# Patient Record
(demographics unavailable — no encounter records)

---

## 2025-02-27 NOTE — HISTORY OF PRESENT ILLNESS
[FreeTextEntry1] : Ajit is a 9yo boy with no significant past history presenting for hospital follow up of recently diagnosed functional neurologic disorder.   Interval Hx: Since discharge, [facility] has noticed that Reji will be unable to walk when he becomes frustrated at home. When he goes to the park or other activity, his symptoms resolve and he is able to play soccer. Videos reviewed of him army crawling at home and playing soccer just an hour later. He denies any symptoms of numbness or backpain, no headaches. Symptoms seem to come and go quickly. MOC denies any known history of trauma or stressors.    Hospital Course: Discharge Date 16-Feb-2025 Admission Date 14-Feb-2025 22:30  Hospital Course  8y10m M w/ NSPMHx presenting with 5d of inability to walk. For past 2-3 weeks, he had cough without fever. He was treated with amoxicillin and Motrin. On Monday 2/10, PMD prescribed prednisone. He took one dose of prednisone in the morning and in the afternoon, began to have feet pain and difficulty walking. Pain is sudden onset, burning sensation involving the entirety of bilateral feet. No pain in calves but does experience numbness in the area. Yesterday, he was able to walk normally briefly but started to fall over. Today, he had similar experience which prompted them to ED. Pt denies feeling weak, states he is not able to feel his legs and numb but no tingling. He denies these symptoms in his hands/arms. Denies fever, chills, sore throat, runny nose, difficulty breathing, nausea, vomiting, diarrhea, constipation. He had rash on his abdomen that consisted of small erythematous papules based on picture from mom, however this has since resolved, and they report no other rashes present. No back pain initially but is noting some middle back discomfort starting today. Voiding normally without incontinence. Not sure when he last had BM. Of note, his sibling was sick with the flu a few weeks ago.  PMH: as above Meds: none NKA  ED Course: CBC, CMP, CK, CRP unremarkable. RVP pos for rhino/entero. Neuro consulted- MRI lumbar and thoracic completed (see results) - no pathologic process noted.  Hospital Course (2/15-2/16): Patient was admitted to the hospital with significant improvement in motor function without treatment consistent with likely functional neurologic disorder. Has no further numbness or tingling. Gait normal. Will give prescription for outpatient PT evaluation.

## 2025-02-27 NOTE — REASON FOR VISIT
[Hospital Follow-Up] : a hospital follow-up for [Other: ____] : [unfilled] [Mother] : mother [Language [employer] ] : provided by [employer]   [Interpreters_IDNumber] : [id number] [Interpreters_FullName] : Elijah [TWNoteComboBox1] : Citizen of [place]

## 2025-02-27 NOTE — PHYSICAL EXAM
[Well-appearing] : well-appearing [Normocephalic] : normocephalic [No dysmorphic facial features] : no dysmorphic facial features [No ocular abnormalities] : no ocular abnormalities [Neck supple] : neck supple [Lungs clear] : lungs clear [Heart sounds regular in rate and rhythm] : heart sounds regular in rate and rhythm [Soft] : soft [No abnormal neurocutaneous stigmata or skin lesions] : no abnormal neurocutaneous stigmata or skin lesions [Straight] : straight [No katt or dimples] : no katt or dimples [No deformities] : no deformities [Alert] : alert [Well related, good eye contact] : well related, good eye contact [Conversant] : conversant [Normal speech and language] : normal speech and language [Follows instructions well] : follows instructions well [VFF] : VFF [Pupils reactive to light and accommodation] : pupils reactive to light and accommodation [Full extraocular movements] : full extraocular movements [Saccadic and smooth pursuits intact] : saccadic and smooth pursuits intact [No nystagmus] : no nystagmus [Normal facial sensation to light touch] : normal facial sensation to light touch [No facial asymmetry or weakness] : no facial asymmetry or weakness [Gross hearing intact] : gross hearing intact [Equal palate elevation] : equal palate elevation [Good shoulder shrug] : good shoulder shrug [Normal tongue movement] : normal tongue movement [Midline tongue, no fasciculations] : midline tongue, no fasciculations [Normal axial and appendicular muscle tone] : normal axial and appendicular muscle tone [Gets up on table without difficulty] : gets up on table without difficulty [No pronator drift] : no pronator drift [Normal finger tapping and fine finger movements] : normal finger tapping and fine finger movements [No abnormal involuntary movements] : no abnormal involuntary movements [5/5 strength in proximal and distal muscles of arms and legs] : 5/5 strength in proximal and distal muscles of arms and legs [2+ biceps] : 2+ biceps [Triceps] : triceps [Knee jerks] : knee jerks [Ankle jerks] : ankle jerks [No ankle clonus] : no ankle clonus [Localizes LT and temperature] : localizes LT and temperature [No dysmetria on FTNT] : no dysmetria on FTNT [Good walking balance] : good walking balance [Normal gait] : normal gait [Able to tandem well] : able to tandem well [Negative Romberg] : negative Romberg [de-identified] : Astasia-abasia gait. Appears weak and uncoordinated intermixed with periods of normal gait [de-identified] : No ataxia with heel-to-shin

## 2025-02-27 NOTE — PHYSICAL EXAM
[Well-appearing] : well-appearing [Normocephalic] : normocephalic [No dysmorphic facial features] : no dysmorphic facial features [No ocular abnormalities] : no ocular abnormalities [Neck supple] : neck supple [Lungs clear] : lungs clear [Heart sounds regular in rate and rhythm] : heart sounds regular in rate and rhythm [Soft] : soft [No abnormal neurocutaneous stigmata or skin lesions] : no abnormal neurocutaneous stigmata or skin lesions [Straight] : straight [No katt or dimples] : no katt or dimples [No deformities] : no deformities [Alert] : alert [Well related, good eye contact] : well related, good eye contact [Conversant] : conversant [Normal speech and language] : normal speech and language [Follows instructions well] : follows instructions well [VFF] : VFF [Pupils reactive to light and accommodation] : pupils reactive to light and accommodation [Full extraocular movements] : full extraocular movements [Saccadic and smooth pursuits intact] : saccadic and smooth pursuits intact [No nystagmus] : no nystagmus [Normal facial sensation to light touch] : normal facial sensation to light touch [No facial asymmetry or weakness] : no facial asymmetry or weakness [Gross hearing intact] : gross hearing intact [Equal palate elevation] : equal palate elevation [Good shoulder shrug] : good shoulder shrug [Normal tongue movement] : normal tongue movement [Midline tongue, no fasciculations] : midline tongue, no fasciculations [Normal axial and appendicular muscle tone] : normal axial and appendicular muscle tone [Gets up on table without difficulty] : gets up on table without difficulty [No pronator drift] : no pronator drift [Normal finger tapping and fine finger movements] : normal finger tapping and fine finger movements [No abnormal involuntary movements] : no abnormal involuntary movements [5/5 strength in proximal and distal muscles of arms and legs] : 5/5 strength in proximal and distal muscles of arms and legs [2+ biceps] : 2+ biceps [Triceps] : triceps [Knee jerks] : knee jerks [Ankle jerks] : ankle jerks [No ankle clonus] : no ankle clonus [Localizes LT and temperature] : localizes LT and temperature [No dysmetria on FTNT] : no dysmetria on FTNT [Good walking balance] : good walking balance [Normal gait] : normal gait [Able to tandem well] : able to tandem well [Negative Romberg] : negative Romberg [de-identified] : Astasia-abasia gait. Appears weak and uncoordinated intermixed with periods of normal gait [de-identified] : No ataxia with heel-to-shin

## 2025-02-27 NOTE — HISTORY OF PRESENT ILLNESS
[FreeTextEntry1] : Ajit is a 9yo boy with no significant past history presenting for hospital follow up of recently diagnosed functional neurologic disorder.   Interval Hx: Since discharge, [facility] [place] has noticed that Reji will be unable to walk when he becomes frustrated at home. When he goes to the park or other activity, his symptoms resolve and he is able to play soccer. Videos reviewed of him army crawling at home and playing soccer just an hour later. He denies any symptoms of numbness or backpain, no headaches. Symptoms seem to come and go quickly. MOC denies any known history of trauma or stressors.    Hospital Course: Discharge Date 16-Feb-2025 Admission Date 14-Feb-2025 22:30  Hospital Course  8y10m M w/ NSPMHx presenting with 5d of inability to walk. For past 2-3 weeks, he had cough without fever. He was treated with amoxicillin and Motrin. On Monday 2/10, PMD prescribed prednisone. He took one dose of prednisone in the morning and in the afternoon, began to have feet pain and difficulty walking. Pain is sudden onset, burning sensation involving the entirety of bilateral feet. No pain in calves but does experience numbness in the area. Yesterday, he was able to walk normally briefly but started to fall over. Today, he had similar experience which prompted them to ED. Pt denies feeling weak, states he is not able to feel his legs and numb but no tingling. He denies these symptoms in his hands/arms. Denies fever, chills, sore throat, runny nose, difficulty breathing, nausea, vomiting, diarrhea, constipation. He had rash on his abdomen that consisted of small erythematous papules based on picture from mom, however this has since resolved, and they report no other rashes present. No back pain initially but is noting some middle back discomfort starting today. Voiding normally without incontinence. Not sure when he last had BM. Of note, his sibling was sick with the flu a few weeks ago.  PMH: as above Meds: none NKA  ED Course: CBC, CMP, CK, CRP unremarkable. RVP pos for rhino/entero. Neuro consulted- MRI lumbar and thoracic completed (see results) - no pathologic process noted.  Hospital Course (2/15-2/16): Patient was admitted to the hospital with significant improvement in motor function without treatment consistent with likely functional neurologic disorder. Has no further numbness or tingling. Gait normal. Will give prescription for outpatient PT evaluation.

## 2025-02-27 NOTE — END OF VISIT
[] : Resident [FreeTextEntry3] : Time spent excludes teaching [Time Spent: ___ minutes] : I have spent [unfilled] minutes of time on the encounter which excludes teaching and separately reported services.

## 2025-02-27 NOTE — ASSESSMENT
[FreeTextEntry1] : 7yo healthy boy presenting for hospital follow up evaluation of recently diagnosed functional neurologic disorder. Neurologic exam today is notable for astasia-abasia gait with intact strength on manual motor testing, intact reflexes and coordination. MRI of his lumbar and thoracic spine was normal. His exam as well has the relapsing-remitting history is consistent with functional neurologic disorder. Discussed diagnosis at length with MOC, that these symptoms are either behavioral (fictitious disorder) or subconscious and occurring without Ajit's awareness (conversion disorder). He has yet to establish care with PT and he does not have a psychiatrist. Will provide referrals to each. Instructed MOC to monitor his symptoms and return for follow up if symptoms change.

## 2025-02-27 NOTE — REASON FOR VISIT
[Hospital Follow-Up] : a hospital follow-up for [Other: ____] : [unfilled] [Mother] : mother [Language [employer] ] : provided by [employer]   [Interpreters_IDNumber] : [id number] [Interpreters_FullName] : Elijah [TWNoteComboBox1] : [place]

## 2025-02-27 NOTE — PLAN
[FreeTextEntry1] : [ ] Referrals to child psychiatry and PT provided [ ] RTC if symptoms change or if new concerns arise